# Patient Record
Sex: MALE | Race: WHITE | Employment: FULL TIME | URBAN - METROPOLITAN AREA
[De-identification: names, ages, dates, MRNs, and addresses within clinical notes are randomized per-mention and may not be internally consistent; named-entity substitution may affect disease eponyms.]

---

## 2019-01-18 ENCOUNTER — APPOINTMENT (OUTPATIENT)
Dept: GENERAL RADIOLOGY | Age: 29
End: 2019-01-18
Attending: EMERGENCY MEDICINE
Payer: COMMERCIAL

## 2019-01-18 ENCOUNTER — HOSPITAL ENCOUNTER (EMERGENCY)
Age: 29
Discharge: HOME | End: 2019-01-18
Attending: EMERGENCY MEDICINE
Payer: COMMERCIAL

## 2019-01-18 VITALS
HEART RATE: 78 BPM | TEMPERATURE: 98.2 F | WEIGHT: 210 LBS | RESPIRATION RATE: 16 BRPM | OXYGEN SATURATION: 97 % | DIASTOLIC BLOOD PRESSURE: 92 MMHG | BODY MASS INDEX: 29.4 KG/M2 | HEIGHT: 71 IN | SYSTOLIC BLOOD PRESSURE: 146 MMHG

## 2019-01-18 DIAGNOSIS — R06.02 SOB (SHORTNESS OF BREATH): Primary | ICD-10-CM

## 2019-01-18 LAB
ANION GAP SERPL CALC-SCNC: 10 MMOL/L (ref 5–15)
BUN SERPL-MCNC: 12 MG/DL (ref 6–20)
BUN/CREAT SERPL: 11 (ref 12–20)
CALCIUM SERPL-MCNC: 9 MG/DL (ref 8.5–10.1)
CHLORIDE SERPL-SCNC: 102 MMOL/L (ref 97–108)
CO2 SERPL-SCNC: 26 MMOL/L (ref 21–32)
COMMENT, HOLDF: NORMAL
CREAT SERPL-MCNC: 1.05 MG/DL (ref 0.7–1.3)
D DIMER PPP FEU-MCNC: 0.37 MG/L FEU (ref 0–0.65)
ERYTHROCYTE [DISTWIDTH] IN BLOOD BY AUTOMATED COUNT: 12.7 % (ref 11.5–14.5)
GLUCOSE SERPL-MCNC: 103 MG/DL (ref 65–100)
HCT VFR BLD AUTO: 44.4 % (ref 36.6–50.3)
HGB BLD-MCNC: 15.9 G/DL (ref 12.1–17)
MCH RBC QN AUTO: 31.4 PG (ref 26–34)
MCHC RBC AUTO-ENTMCNC: 35.8 G/DL (ref 30–36.5)
MCV RBC AUTO: 87.7 FL (ref 80–99)
NRBC # BLD: 0 K/UL (ref 0–0.01)
NRBC BLD-RTO: 0 PER 100 WBC
PLATELET # BLD AUTO: 302 K/UL (ref 150–400)
PMV BLD AUTO: 9.7 FL (ref 8.9–12.9)
POTASSIUM SERPL-SCNC: 3.8 MMOL/L (ref 3.5–5.1)
RBC # BLD AUTO: 5.06 M/UL (ref 4.1–5.7)
SAMPLES BEING HELD,HOLD: NORMAL
SODIUM SERPL-SCNC: 138 MMOL/L (ref 136–145)
WBC # BLD AUTO: 8.8 K/UL (ref 4.1–11.1)

## 2019-01-18 PROCEDURE — 85027 COMPLETE CBC AUTOMATED: CPT

## 2019-01-18 PROCEDURE — 85379 FIBRIN DEGRADATION QUANT: CPT

## 2019-01-18 PROCEDURE — 80048 BASIC METABOLIC PNL TOTAL CA: CPT

## 2019-01-18 PROCEDURE — 99282 EMERGENCY DEPT VISIT SF MDM: CPT

## 2019-01-18 PROCEDURE — 36415 COLL VENOUS BLD VENIPUNCTURE: CPT

## 2019-01-18 PROCEDURE — 71046 X-RAY EXAM CHEST 2 VIEWS: CPT

## 2019-01-18 RX ORDER — ALBUTEROL SULFATE 90 UG/1
2 AEROSOL, METERED RESPIRATORY (INHALATION)
Qty: 1 INHALER | Refills: 0 | Status: SHIPPED | OUTPATIENT
Start: 2019-01-18

## 2019-01-18 RX ORDER — FAMOTIDINE 20 MG/1
TABLET, FILM COATED ORAL
COMMUNITY
End: 2019-09-19 | Stop reason: ALTCHOICE

## 2019-01-18 RX ORDER — LEVOCETIRIZINE DIHYDROCHLORIDE 5 MG/1
5 TABLET, FILM COATED ORAL
COMMUNITY

## 2019-01-18 NOTE — ED TRIAGE NOTES
Pt has had SOB since Tuesday. Also had dizziness a couple nights ago. Pt feels like he cant take a deep breathe.

## 2019-01-18 NOTE — ED NOTES
The patient was discharged home by Dr Andie Vilchis in stable condition. The patient is alert and oriented, in no respiratory distress and discharge vital signs obtained. The patient's diagnosis, condition and treatment were explained. The patient expressed understanding. One prescription given. A discharge plan has been developed. Aftercare instructions were given. Pt ambulatory out of the ED.

## 2019-01-18 NOTE — ED PROVIDER NOTES
33yo M presents from home with cc of sob. PMH sig for HIV, gerd, liver disease. Pt feeling sob for 4 days. Started while on flight home from vacation in [place]. Denies cough, fever, n/v, chest pain. Since returning, pt has been able to go about normal activity but feels like he needs to take extra deep breaths. Never had symptoms like this before. No h/o DVT. + remote history of asthma. Shortness of Breath Pertinent negatives include no fever, no headaches, no cough, no chest pain, no abdominal pain and no rash. Past Medical History:  
Diagnosis Date  Acid reflux  HIV (human immunodeficiency virus infection) [facility] [address] [facility]  [facility] (human immunodeficiency virus infection) ([facility] 01/18/2019  Liver disease Fatty Liver Past Surgical History:  
Procedure Laterality Date  HX TYMPANOSTOMY History reviewed. No pertinent family history. Social History Socioeconomic History  Marital status: SINGLE Spouse name: Not on file  Number of children: Not on file  Years of education: Not on file  Highest education level: Not on file Social Needs  Financial resource strain: Not on file  Food insecurity - worry: Not on file  Food insecurity - inability: Not on file  Transportation needs - medical: Not on file  Transportation needs - non-medical: Not on file Occupational History  Not on file Tobacco Use  Smoking status: Current Every Day Smoker  Smokeless tobacco: Never Used Substance and Sexual Activity  Alcohol use: Yes Comment: rarely  Drug use: No  
 Sexual activity: Not on file Other Topics Concern  Not on file Social History Narrative  Not on file ALLERGIES: Amoxicillin and Augmentin [amoxicillin-pot clavulanate] Review of Systems Constitutional: Negative for diaphoresis and fever. HENT: Negative for facial swelling. Eyes: Negative for visual disturbance. Respiratory: Positive for shortness of breath. Negative for cough. Cardiovascular: Negative for chest pain. Gastrointestinal: Negative for abdominal pain. Genitourinary: Negative for dysuria. Musculoskeletal: Negative for joint swelling. Skin: Negative for rash. Neurological: Negative for headaches. Hematological: Negative for adenopathy. Psychiatric/Behavioral: Negative for suicidal ideas. Vitals:  
 01/18/19 1621 BP: (!) 153/100 Pulse: 78 Resp: 16 Temp: 98.2 °F (36.8 °C) SpO2: 98% Weight: 95.3 kg (210 lb) Height: 5' 11\" (1.803 m) Physical Exam  
Constitutional: He is oriented to person, place, and time. He appears well-developed and well-nourished. No distress. HENT:  
Head: Normocephalic and atraumatic. Mouth/Throat: Oropharynx is clear and moist.  
Eyes: Pupils are equal, round, and reactive to light. Neck: Normal range of motion. Neck supple. Cardiovascular: Normal rate, regular rhythm, normal heart sounds and intact distal pulses. Pulmonary/Chest: Effort normal and breath sounds normal. No respiratory distress. Abdominal: Soft. Bowel sounds are normal. He exhibits no distension. There is no tenderness. Musculoskeletal: Normal range of motion. He exhibits no edema. Neurological: He is alert and oriented to person, place, and time. Skin: Skin is warm and dry. Nursing note and vitals reviewed. MDM Number of Diagnoses or Management Options SOB (shortness of breath):  
Diagnosis management comments: A:  Sob with normal VS and exam.  Only risk factor for PE is recent travel. D-dimer normal.  Labs and CXR unremarkable. VS remain normal.  
 
P: 
Pt stable for discharge. Will give trial of alb MDI 
F/u with PCP as needed. Procedures

## 2019-01-18 NOTE — DISCHARGE INSTRUCTIONS

## 2019-06-26 ENCOUNTER — OFFICE VISIT (OUTPATIENT)
Dept: INTERNAL MEDICINE CLINIC | Age: 29
End: 2019-06-26

## 2019-06-26 VITALS
TEMPERATURE: 98.9 F | SYSTOLIC BLOOD PRESSURE: 130 MMHG | HEART RATE: 57 BPM | OXYGEN SATURATION: 98 % | DIASTOLIC BLOOD PRESSURE: 79 MMHG | HEIGHT: 72 IN | RESPIRATION RATE: 16 BRPM | BODY MASS INDEX: 29.26 KG/M2 | WEIGHT: 216 LBS

## 2019-06-26 DIAGNOSIS — S40.861A INSECT BITE OF RIGHT UPPER ARM, INITIAL ENCOUNTER: Primary | ICD-10-CM
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
## 2019-06-26 PROBLEM — B20 HIV (HUMAN IMMUNODEFICIENCY VIRUS INFECTION) (HCC): Status: ACTIVE | Noted: 2019-06-26

## 2019-06-26 RX ORDER — CEPHALEXIN 500 MG/1
500 CAPSULE ORAL 4 TIMES DAILY
Qty: 40 CAP | Refills: 0 | Status: SHIPPED | OUTPATIENT
Start: 2019-06-26 | End: 2019-09-19 | Stop reason: ALTCHOICE

## 2019-06-26 RX ORDER — ERGOCALCIFEROL 1.25 MG/1
50000 CAPSULE ORAL
COMMUNITY

## 2019-06-26 RX ORDER — OMEPRAZOLE 40 MG/1
40 CAPSULE, DELAYED RELEASE ORAL DAILY
COMMUNITY
End: 2019-09-19 | Stop reason: ALTCHOICE

## 2019-06-26 RX ORDER — ATORVASTATIN CALCIUM 20 MG/1
TABLET, FILM COATED ORAL DAILY
COMMUNITY

## 2019-06-26 NOTE — PROGRESS NOTES
Chaitanya Rodriguez  Identified pt with two pt identifiers(name and ). Chief Complaint   Patient presents with   [address]     right arm    Other     swollen right armpit   Patient of Dr Yonathan Naqvi who recently moved back to the area from [place]. Works for [employer] at [employer]. Noted a red area on upper arm yesterday which appeared to be an insect bite. This morning his armpit was also red & swollen. 1. Have you been to the ER, urgent care clinic since your last visit? Hospitalized since your last visit? no    2. Have you seen or consulted any other health care providers outside of the [address] [place] [address] since your last visit? Include any pap smears or colon screening. no      Medication reconciliation up to date and corrected with patient at this time. Today's provider has been notified of reason for visit, vitals and flowsheets obtained on patients. Reviewed record in preparation for visit, huddled with provider and have obtained necessary documentation. Depression Risk Factor Screening:     3 most recent PHQ Screens 2019   Little interest or pleasure in doing things Not at all   Feeling down, depressed, irritable, or hopeless Several days   Total Score PHQ 2 1       Functional Ability and Level of Safety:     Activities of Daily Living  No flowsheet data found. Fall Risk  No flowsheet data found. Abuse Screen  Abuse Screening Questionnaire 2019   Do you ever feel afraid of your partner? N   Are you in a relationship with someone who physically or mentally threatens you? N   Is it safe for you to go home?  Y           Health Maintenance Due   Topic    Pneumococcal 0-64 years (1 of 1 - PPSV23)    DTaP/Tdap/Td series (1 - Tdap)       Wt Readings from Last 3 Encounters:   19 216 lb (98 kg)   19 210 lb (95.3 kg)   16 170 lb (77.1 kg)     Temp Readings from Last 3 Encounters:   19 98.9 °F (37.2 °C) (Oral)   19 98.2 °F (36.8 °C) 03/14/16 98.5 °F (36.9 °C)     BP Readings from Last 3 Encounters:   06/26/19 130/79   01/18/19 (!) 146/92   03/14/16 120/74     Pulse Readings from Last 3 Encounters:   06/26/19 (!) 57   01/18/19 78   03/14/16 70     Vitals:    06/26/19 1134   BP: 130/79   Pulse: (!) 57   Resp: 16   Temp: 98.9 °F (37.2 °C)   TempSrc: Oral   SpO2: 98%   Weight: 216 lb (98 kg)   Height: 6' (1.829 m)   PainSc:   4   PainLoc: Arm         Patient Care Team   Patient Care Team:  Ivonne Donato NP as PCP - General (Nurse Practitioner)  Nawaf Be MD (Internal Medicine)

## 2019-06-26 NOTE — PATIENT INSTRUCTIONS

## 2019-06-26 NOTE — PROGRESS NOTES
Subjective:  Mr. Reggie York is a pleasant 34year old gentleman, patient of Dr. Jluis Gao, who comes in today for evaluation of a probable insect bite to his right upper arm. He tells me that he noted some soreness in his left upper arm, as well as some swollen glands in his right axilla yesterday. He really does not recall having been bitten by any insect, however he felt stinging on this right upper arm. Today he no longer feels a node in his right armpit, but remains slightly tender. He denies chills or fever. He denies nausea or vomiting. Further discussion with him revealed he is HIV positive. He is currently being treated at [facility]. He has been tolerating his medications well and his numbers are stable. Further discussion with him revealed that at the beginning of his diagnosis he also was diagnosed with Kaposi sarcoma. As his HIV was treated, this subsided. He tells me that [facility] are doing  CT scan every six months to look for lymphadenopathy. .  He recently had a CT scan that was normal.  He also showed me some lab studies he had done at [facility] in May, which revealed his WBC at 6.3. Past Medical History:   Diagnosis Date    Acid reflux     [facility] (human immunodeficiency virus infection) ([facility] [address] [facility]     HIV (human immunodeficiency virus infection) ([facility] [address] [facility] 01/18/2019    Liver disease     Fatty Liver     Past Surgical History:   Procedure Laterality Date    HX TYMPANOSTOMY         Current Outpatient Medications on File Prior to Visit   Medication Sig Dispense Refill    ergocalciferol (VITAMIN D2) 50,000 unit capsule Take 50,000 Units by mouth.  omeprazole (PRILOSEC) 40 mg capsule Take 40 mg by mouth daily.  atorvastatin (LIPITOR) 20 mg tablet Take  by mouth daily.  elvitegravir-cobicistat-emtricitabine-tenofovir alafenamide (GENVOYA) tab tablet Take 1 Tab by mouth daily (with breakfast).  levocetirizine (XYZAL) 5 mg tablet Take 5 mg by mouth.       famotidine (PEPCID) 20 mg tablet Take by mouth nightly.  albuterol (PROVENTIL HFA, VENTOLIN HFA, PROAIR HFA) 90 mcg/actuation inhaler Take 2 Puffs by inhalation every four (4) hours as needed for Wheezing. 1 Inhaler 0     No current facility-administered medications on file prior to visit. Allergies   Allergen Reactions    Amoxicillin Rash    Augmentin [Amoxicillin-Pot Clavulanate] Rash     Physical Examination:  GENERAL:  Pleasant young gentleman in no acute distress. He is alert and oriented. He answers my questions appropriately. He is non toxic. VITALS:  BP: 130/79. P: 57.  T: 98.9.  R: 16.  O2 sat: 98%. HEENT:  Normocephalic, atraumatic. NECK:  Supple without adenopathy. CHEST:  Lungs clear to auscultation. CV:  Heart regular rhythm without murmur. ABDOMEN:  Soft, NT, no OM or masses. EXTREMITIES:  No edema or calf tenderness. Distal pulses were present. Right arm - he has an approximately 25 cent reddened, slightly raised area on his mid upper arm. There is a little bit of redness that extends. I did not feel any axillary adenopathy on this right side. He is mildly tender. No extended cellulitis. Impression:  1. I suspect this is from an insect bite. Plan:  1. It was opted to start him on Keflex 500 mg four times a day for the next ten days. 2. He certainly will contact us should he have any concerns or if his condition worsens. 3. Should he develop chills, fever, nausea, vomiting, he should report to the ER.

## 2019-08-08 ENCOUNTER — APPOINTMENT (OUTPATIENT)
Dept: CT IMAGING | Age: 29
End: 2019-08-08
Attending: EMERGENCY MEDICINE
Payer: COMMERCIAL

## 2019-08-08 ENCOUNTER — HOSPITAL ENCOUNTER (EMERGENCY)
Age: 29
Discharge: HOME | End: 2019-08-08
Attending: EMERGENCY MEDICINE
Payer: COMMERCIAL

## 2019-08-08 VITALS
WEIGHT: 213.41 LBS | BODY MASS INDEX: 28.94 KG/M2 | RESPIRATION RATE: 18 BRPM | TEMPERATURE: 98.7 F | OXYGEN SATURATION: 97 % | DIASTOLIC BLOOD PRESSURE: 89 MMHG | SYSTOLIC BLOOD PRESSURE: 133 MMHG | HEART RATE: 76 BPM

## 2019-08-08 DIAGNOSIS — K04.7 TOOTH ABSCESS: Primary | ICD-10-CM

## 2019-08-08 LAB
ANION GAP SERPL CALC-SCNC: 12 MMOL/L (ref 5–15)
BASOPHILS # BLD: 0 K/UL (ref 0–0.1)
BASOPHILS NFR BLD: 0 % (ref 0–1)
BUN SERPL-MCNC: 12 MG/DL (ref 6–20)
BUN/CREAT SERPL: 11 (ref 12–20)
CALCIUM SERPL-MCNC: 9.6 MG/DL (ref 8.5–10.1)
CHLORIDE SERPL-SCNC: 100 MMOL/L (ref 97–108)
CO2 SERPL-SCNC: 25 MMOL/L (ref 21–32)
COMMENT, HOLDF: NORMAL
CREAT SERPL-MCNC: 1.08 MG/DL (ref 0.7–1.3)
DIFFERENTIAL METHOD BLD: NORMAL
EOSINOPHIL # BLD: 0.1 K/UL (ref 0–0.4)
EOSINOPHIL NFR BLD: 1 % (ref 0–7)
ERYTHROCYTE [DISTWIDTH] IN BLOOD BY AUTOMATED COUNT: 12.4 % (ref 11.5–14.5)
GLUCOSE SERPL-MCNC: 114 MG/DL (ref 65–100)
HCT VFR BLD AUTO: 45.1 % (ref 36.6–50.3)
HGB BLD-MCNC: 15.8 G/DL (ref 12.1–17)
IMM GRANULOCYTES # BLD AUTO: 0 K/UL (ref 0–0.04)
IMM GRANULOCYTES NFR BLD AUTO: 0 % (ref 0–0.5)
LYMPHOCYTES # BLD: 2 K/UL (ref 0.8–3.5)
LYMPHOCYTES NFR BLD: 24 % (ref 12–49)
MCH RBC QN AUTO: 30.9 PG (ref 26–34)
MCHC RBC AUTO-ENTMCNC: 35 G/DL (ref 30–36.5)
MCV RBC AUTO: 88.3 FL (ref 80–99)
MONOCYTES # BLD: 0.8 K/UL (ref 0–1)
MONOCYTES NFR BLD: 10 % (ref 5–13)
NEUTS SEG # BLD: 5.4 K/UL (ref 1.8–8)
NEUTS SEG NFR BLD: 65 % (ref 32–75)
NRBC # BLD: 0 K/UL (ref 0–0.01)
NRBC BLD-RTO: 0 PER 100 WBC
PLATELET # BLD AUTO: 295 K/UL (ref 150–400)
PMV BLD AUTO: 9.9 FL (ref 8.9–12.9)
POTASSIUM SERPL-SCNC: 4 MMOL/L (ref 3.5–5.1)
RBC # BLD AUTO: 5.11 M/UL (ref 4.1–5.7)
SAMPLES BEING HELD,HOLD: NORMAL
SODIUM SERPL-SCNC: 137 MMOL/L (ref 136–145)
WBC # BLD AUTO: 8.4 K/UL (ref 4.1–11.1)

## 2019-08-08 PROCEDURE — 96372 THER/PROPH/DIAG INJ SC/IM: CPT

## 2019-08-08 PROCEDURE — 99283 EMERGENCY DEPT VISIT LOW MDM: CPT

## 2019-08-08 PROCEDURE — 74011250636 HC RX REV CODE- 250/636: Performed by: EMERGENCY MEDICINE

## 2019-08-08 PROCEDURE — 36415 COLL VENOUS BLD VENIPUNCTURE: CPT

## 2019-08-08 PROCEDURE — 80048 BASIC METABOLIC PNL TOTAL CA: CPT

## 2019-08-08 PROCEDURE — 85025 COMPLETE CBC W/AUTO DIFF WBC: CPT

## 2019-08-08 RX ORDER — SODIUM CHLORIDE 9 MG/ML
50 INJECTION, SOLUTION INTRAVENOUS ONCE
Status: DISCONTINUED | OUTPATIENT
Start: 2019-08-08 | End: 2019-08-08

## 2019-08-08 RX ORDER — CLINDAMYCIN PHOSPHATE 900 MG/50ML
900 INJECTION INTRAVENOUS
Status: COMPLETED | OUTPATIENT
Start: 2019-08-08 | End: 2019-08-08

## 2019-08-08 RX ORDER — SODIUM CHLORIDE 0.9 % (FLUSH) 0.9 %
10 SYRINGE (ML) INJECTION ONCE
Status: DISCONTINUED | OUTPATIENT
Start: 2019-08-08 | End: 2019-08-08

## 2019-08-08 RX ADMIN — CLINDAMYCIN PHOSPHATE 900 MG: 900 INJECTION, SOLUTION INTRAVENOUS at 14:24

## 2019-08-08 NOTE — ED PROVIDER NOTES
HPI   The patient is a 79-year-old black male who presents to the emergency room with acute onset of having had third molars removed both upper and lower on the right side of his jaw 5 days prior to admission. Incidentally he has HIV with a CD4 count above 800 on Doloris Nigh therapy. Since then he was on clindamycin 300 mg 4 times a day but had increased swelling in the cheek and yesterday his dentist change his antibiotics to Keflex 500 twice 4 times a day. He has some mild swelling of the right cheek which is obscured the nasolabial fold but there is no redness or heat associated with it. He states he has been having fevers but he has none here. He came here for further evaluation and treatment. Past Medical History:   Diagnosis Date    Acid reflux     HIV (human immunodeficiency virus infection) ([facility]     HIV (human immunodeficiency virus infection) ([facility] 01/18/2019    Liver disease     Fatty Liver       Past Surgical History:   Procedure Laterality Date    HX TYMPANOSTOMY           History reviewed. No pertinent family history.     Social History     Socioeconomic History    Marital status: SINGLE     Spouse name: Not on file    Number of children: Not on file    Years of education: Not on file    Highest education level: Not on file   Occupational History    Not on file   Social Needs    Financial resource strain: Not on file    Food insecurity:     Worry: Not on file     Inability: Not on file    Transportation needs:     Medical: Not on file     Non-medical: Not on file   Tobacco Use    Smoking status: Current Every Day Smoker    Smokeless tobacco: Never Used   Substance and Sexual Activity    Alcohol use: Yes     Comment: rarely    Drug use: No    Sexual activity: Not on file   Lifestyle    Physical activity:     Days per week: Not on file     Minutes per session: Not on file    Stress: Not on file   Relationships    Social connections:     Talks on phone: Not on file     Gets together: Not on file     Attends Yarsanism service: Not on file     Active member of club or organization: Not on file     Attends meetings of clubs or organizations: Not on file     Relationship status: Not on file    Intimate partner violence:     Fear of current or ex partner: Not on file     Emotionally abused: Not on file     Physically abused: Not on file     Forced sexual activity: Not on file   Other Topics Concern    Not on file   Social History Narrative    Not on file         ALLERGIES: Amoxicillin and Augmentin [amoxicillin-pot clavulanate]    Review of Systems   All other systems reviewed and are negative. Vitals:    08/08/19 1350   BP: 133/89   Pulse: 76   Resp: 18   Temp: 98.7 °F (37.1 °C)   SpO2: 97%   Weight: 96.8 kg (213 lb 6.5 oz)            Physical Exam   Constitutional: He is oriented to person, place, and time. He appears well-developed. HENT:   Head: Normocephalic. There is some swelling of the right maxillary area with ill-defined nasolabial fold but no erythema or heat   Eyes: Pupils are equal, round, and reactive to light. Musculoskeletal: Normal range of motion. Neurological: He is alert and oriented to person, place, and time. Skin: Skin is dry. Psychiatric: He has a normal mood and affect. His behavior is normal.        MDM       Procedures           assessment and plan  I spoke with Dr. arvin dempsey from oral surgery and he would like to see the patient now before 4 PM and did not think a CT scan would be helpful.   Patient was discharged at 315 to go directly to see Dr. General Sherman

## 2019-08-08 NOTE — ED TRIAGE NOTES
TRIAGE NOTE:  Had wisdom teeth pulled Saturday, has been having fevers since about Tuesday.   Pt is concerned he has an infection, his dentist put him on antibiotics (keflex)  Pt concerned in part due to his HIV + status

## 2019-09-19 ENCOUNTER — OFFICE VISIT (OUTPATIENT)
Dept: INTERNAL MEDICINE CLINIC | Age: 29
End: 2019-09-19

## 2019-09-19 VITALS
TEMPERATURE: 98.2 F | HEIGHT: 72 IN | OXYGEN SATURATION: 98 % | RESPIRATION RATE: 16 BRPM | WEIGHT: 219.6 LBS | DIASTOLIC BLOOD PRESSURE: 81 MMHG | SYSTOLIC BLOOD PRESSURE: 127 MMHG | HEART RATE: 60 BPM | BODY MASS INDEX: 29.74 KG/M2

## 2019-09-19 DIAGNOSIS — J01.90 ACUTE SINUSITIS, RECURRENCE NOT SPECIFIED, UNSPECIFIED LOCATION: ICD-10-CM

## 2019-09-19 DIAGNOSIS — J02.9 ACUTE PHARYNGITIS, UNSPECIFIED ETIOLOGY: Primary | ICD-10-CM

## 2019-09-19 RX ORDER — PREDNISONE 10 MG/1
TABLET ORAL
Qty: 21 TAB | Refills: 0 | Status: SHIPPED | OUTPATIENT
Start: 2019-09-19 | End: 2019-12-20 | Stop reason: ALTCHOICE

## 2019-09-19 RX ORDER — HYDROGEN PEROXIDE 3 %
SOLUTION, NON-ORAL MISCELLANEOUS DAILY
COMMUNITY
End: 2019-09-19 | Stop reason: SDUPTHER

## 2019-09-19 RX ORDER — AZITHROMYCIN 250 MG/1
TABLET, FILM COATED ORAL
Qty: 6 TAB | Refills: 0 | Status: SHIPPED | OUTPATIENT
Start: 2019-09-19 | End: 2019-12-20 | Stop reason: ALTCHOICE

## 2019-09-19 RX ORDER — HYDROGEN PEROXIDE 3 %
20 SOLUTION, NON-ORAL MISCELLANEOUS DAILY
Qty: 90 CAP | Refills: 3 | Status: SHIPPED | OUTPATIENT
Start: 2019-09-19 | End: 2020-06-19 | Stop reason: SDUPTHER

## 2019-09-19 NOTE — PROGRESS NOTES
Subjective:  Mr. Pino Anne is a pleasant 34year old gentleman who comes in today for evaluation of a one week history of what started out as sore throat and now he has developed some nasal congestion, postnasal drainage, pressure over his sinuses and a cough he attributes to the drainage. He denies SOB, wheezing or hemoptysis. Denies nausea or vomiting. Denies chills or fever. He has been using some Mucinex from OTC without any improvement. Past Medical History:   Diagnosis Date    Acid reflux     [facility]) [facility] [facility]     [facility] 01/18/2019    Liver disease     Fatty Liver     Past Surgical History:   Procedure Laterality Date    HX TYMPANOSTOMY         Current Outpatient Medications on File Prior to Visit   Medication Sig Dispense Refill    ergocalciferol (VITAMIN D2) 50,000 unit capsule Take 50,000 Units by mouth.  atorvastatin (LIPITOR) 20 mg tablet Take  by mouth daily.  elvitegravir-cobicistat-emtricitabine-tenofovir alafenamide (GENVOYA) tab tablet Take 1 Tab by mouth daily (with breakfast).  levocetirizine (XYZAL) 5 mg tablet Take 5 mg by mouth.  albuterol (PROVENTIL HFA, VENTOLIN HFA, PROAIR HFA) 90 mcg/actuation inhaler Take 2 Puffs by inhalation every four (4) hours as needed for Wheezing. 1 Inhaler 0     No current facility-administered medications on file prior to visit. Allergies   Allergen Reactions    Amoxicillin Rash    Augmentin [Amoxicillin-Pot Clavulanate] Rash   Physical Examination:  GENERAL:  Pleasant gentleman in no acute distress. He is alert and oriented. Answers my questions appropriately. VITALS:  BP: 127/81. P: 60.  R: 16.  T: 98.2. O2 sat: 98. HEENT:  Normocephalic, atraumatic. TMs normal.  Mouth mucosa pink. Tongue midline. Pharynx erythematous without presence of exudate. He is mildly tender over maxillary sinuses.   NECK:  Supple with non tender, posterior cervical adenopathy, left worse than right. CHEST:  Lungs clear to auscultation, no rales or wheezes. CV:  Heart regular rhythm without murmur. EXTREMITIES:  No edema or calf tenderness. Distal pulses were present. Impression:  1. Acute sinusitis. 2. Acute pharyngitis. Plan:  1. It was opted to start him on a Z-Parminder along with a prednisone pack. 2. He may gargle with salty water. 3. He is to increase fluid and rest.  4. He may use Tylenol alternating with ibuprofen for fever or aches.   He certainly will contact us should he fail to improve or if his conditio

## 2019-09-19 NOTE — PROGRESS NOTES
Chief Complaint   Patient presents with    Sore Throat     clear drainage down throat, taking ipratropium no improvement         1. Have you been to the ER, urgent care clinic since your last visit? Hospitalized since your last visit? no    2. Have you seen or consulted any other health care providers outside of the [address] [place] [address] since your last visit? Include any pap smears or colon screening.   no

## 2019-09-19 NOTE — PATIENT INSTRUCTIONS
Sore Throat: Care Instructions  Your Care Instructions    Infection by bacteria or a virus causes most sore throats. Cigarette smoke, dry air, air pollution, allergies, and yelling can also cause a sore throat. Sore throats can be painful and annoying. Fortunately, most sore throats go away on their own. If you have a bacterial infection, your doctor may prescribe antibiotics. Follow-up care is a key part of your treatment and safety. Be sure to make and go to all appointments, and call your doctor if you are having problems. It's also a good idea to know your test results and keep a list of the medicines you take. How can you care for yourself at home? · If your doctor prescribed antibiotics, take them as directed. Do not stop taking them just because you feel better. You need to take the full course of antibiotics. · Gargle with warm salt water once an hour to help reduce swelling and relieve discomfort. Use 1 teaspoon of salt mixed in 1 cup of warm water. · Take an over-the-counter pain medicine, such as acetaminophen (Tylenol), ibuprofen (Advil, Motrin), or naproxen (Aleve). Read and follow all instructions on the label. · Be careful when taking over-the-counter cold or flu medicines and Tylenol at the same time. Many of these medicines have acetaminophen, which is Tylenol. Read the labels to make sure that you are not taking more than the recommended dose. Too much acetaminophen (Tylenol) can be harmful. · Drink plenty of fluids. Fluids may help soothe an irritated throat. Hot fluids, such as tea or soup, may help decrease throat pain. · Use over-the-counter throat lozenges to soothe pain. Regular cough drops or hard candy may also help. These should not be given to young children because of the risk of choking. · Do not smoke or allow others to smoke around you. If you need help quitting, talk to your doctor about stop-smoking programs and medicines.  These can increase your chances of quitting for good. · Use a vaporizer or humidifier to add moisture to your bedroom. Follow the directions for cleaning the machine. When should you call for help? Call your doctor now or seek immediate medical care if:    · You have new or worse trouble swallowing.     · Your sore throat gets much worse on one side.    Watch closely for changes in your health, and be sure to contact your doctor if you do not get better as expected. Where can you learn more? Go to http://maki-delvis.info/. Enter [phone] [id number] 19 in the search box to learn more about \"Sore Throat: Care Instructions. \"  Current as of: October 21, 2018  Content Version: 12.1  © 2421-8790 [employer]. Care instructions adapted under license by [employer] (which disclaims liability or warranty for this information). If you have questions about a medical condition or this instruction, always ask your healthcare professional. [address] any warranty or liability for your use of this information.

## 2019-12-20 ENCOUNTER — OFFICE VISIT (OUTPATIENT)
Dept: INTERNAL MEDICINE CLINIC | Age: 29
End: 2019-12-20

## 2019-12-20 VITALS
WEIGHT: 230 LBS | DIASTOLIC BLOOD PRESSURE: 72 MMHG | TEMPERATURE: 97.9 F | BODY MASS INDEX: 31.15 KG/M2 | HEART RATE: 68 BPM | SYSTOLIC BLOOD PRESSURE: 116 MMHG | OXYGEN SATURATION: 98 % | RESPIRATION RATE: 16 BRPM | HEIGHT: 72 IN

## 2019-12-20 DIAGNOSIS — J01.00 ACUTE NON-RECURRENT MAXILLARY SINUSITIS: Primary | ICD-10-CM

## 2019-12-20 DIAGNOSIS — B20 HIV INFECTION, UNSPECIFIED SYMPTOM STATUS (HCC): ICD-10-CM

## 2019-12-20 RX ORDER — CEFUROXIME AXETIL 500 MG/1
500 TABLET ORAL 2 TIMES DAILY
Qty: 20 TAB | Refills: 0 | Status: SHIPPED | OUTPATIENT
Start: 2019-12-20 | End: 2020-04-01 | Stop reason: ALTCHOICE

## 2019-12-20 NOTE — PATIENT INSTRUCTIONS
Acquired Immunodeficiency Syndrome (AIDS): Care Instructions Your Care Instructions Human immunodeficiency virus (HIV) is a virus that attacks the body's immune system. This makes it hard for the body to fight infection and disease. HIV causes acquired immunodeficiency syndrome (AIDS). AIDS is the last and most severe stage of the HIV infection. HIV attacks and destroys a type of white blood cell called CD4+ cells, or helper cells. These cells are an important part of the immune system. You have AIDS when one or both of the following are true: 
· Your CD4+ cell count is below 200 cells per microliter (µL) of blood. · You get certain infections or cancers that are usually only seen in people who have problems with their immune system. Follow-up care is a key part of your treatment and safety. Be sure to make and go to all appointments, and call your doctor if you are having problems. It's also a good idea to know your test results and keep a list of the medicines you take. How can you care for yourself at home? · Take your medicines exactly as prescribed. Call your doctor if you think you are having a problem with your medicine. · Get the vaccines and medicine you need to prevent infections such as pneumonia or tuberculosis. · Learn more about HIV and AIDS so you can be active in your health care decisions. · Join a support group. These let you share experiences and seek support from others in the same situation. · Do not smoke. People with HIV have an increased risk of heart attacks and lung cancer. Smoking increases these risks even more. If you need help quitting, talk to your doctor about stop-smoking programs and medicines. These can increase your chances of quitting for good. · Do not use illegal drugs. And limit your use of alcohol. · Eat a healthy, balanced diet. This keeps your immune system as strong as possible. · Exercise regularly. It can reduce your stress, increase your energy, and lift your mood. · If you have not already done so, prepare a list of advance directives. These tell your doctor and family members what kind of care you want if you become unable to speak for yourself. Helping a partner with AIDS If your partner has AIDS, you can help provide emotional, physical, and medical care that will improve his or her quality of life. · Give emotional support. Listen to and encourage your partner. · Protect yourself and others against HIV infection and other infections by: 
? Not sharing needles. ? Always using condoms during sex. · Protect your partner by staying away from him or her when you are sick. · Take care of yourself. Share your experiences with others and get help when you need it. · Learn how to give medicines, and know where to get help in an emergency. When should you call for help? Call 911 anytime you think you may need emergency care. For example, call if: 
  · You passed out (lost consciousness).  
  · You have severe shortness of breath.  
  · You have symptoms of a stroke. These may include: 
? Sudden numbness, tingling, weakness, or loss of movement in your face, arm, or leg, especially on only one side of your body. ? Sudden vision changes. ? Sudden trouble speaking. ? Sudden confusion or trouble understanding simple statements. ? Sudden problems with walking or balance. ? A sudden, severe headache that is different from past headaches.  
 Call your doctor now or seek immediate medical care if: 
  · You have signs of a new or worse problem from HIV, such as: ? A fever. ? Coughing. ? Diarrhea. ? Skin changes. ? Bleeding. ? Confusion or not thinking clearly.  
 Watch closely for changes in your health, and be sure to contact your doctor if you have any problems. Where can you learn more? Go to http://maki-delvis.info/. Enter L075 in the search box to learn more about \"Acquired Immunodeficiency Syndrome (AIDS): Care Instructions. \" Current as of: June 9, 2019 Content Version: 12.2 © 1677-2316 [employer]. Care instructions adapted under license by [employer] (which disclaims liability or warranty for this information). If you have questions about a medical condition or this instruction, always ask your healthcare professional. [place] [address] any warranty or liability for your use of this information.

## 2019-12-20 NOTE — PROGRESS NOTES
Sherie Eldridge is a 34 y.o. male     Chief Complaint   Patient presents with    Sinus Infection     scratchy throat sinus pressure and drainage for the past few days. Visit Vitals  /72 (BP 1 Location: Left arm, BP Patient Position: Sitting)   Pulse 68   Temp 97.9 °F (36.6 °C) (Oral)   Resp 16   Ht 6' (1.829 m)   Wt 230 lb (104.3 kg)   SpO2 98%   BMI 31.19 kg/m²       Health Maintenance Due   Topic Date Due    Pneumococcal 0-64 years (1 of 3 - PCV13) 05/30/1996    DTaP/Tdap/Td series (1 - Tdap) 05/30/2001    Influenza Age 5 to Adult  08/01/2019       1. Have you been to the ER, urgent care clinic since your last visit? Hospitalized since your last visit? No    2. Have you seen or consulted any other health care providers outside of the [facility] since your last visit? Include any pap smears or colon screening.  No

## 2019-12-20 NOTE — PROGRESS NOTES
Subjective:   Peyman Mari is a 34 y.o. male      Chief Complaint   Patient presents with    Sinus Infection     scratchy throat sinus pressure and drainage for the past few days. History of present illness: He presents complaint a lot of sinus and nasal congestion with some purulent nasal drainage. This been on for several days and not clearing up. He does have a little cough but no sore throat. He has noted no fevers or chills.     Patient Active Problem List   Diagnosis Code    HIV (human immunodeficiency virus infection) ([facility] B18      Past Medical History:   Diagnosis Date    Acid reflux     HIV (human immunodeficiency virus infection) ([facility]     HIV (human immunodeficiency virus infection) ([facility] 2019    Liver disease     Fatty Liver      Allergies   Allergen Reactions    Amoxicillin Rash    Augmentin [Amoxicillin-Pot Clavulanate] Rash      Family History   Problem Relation Age of Onset    Heart Attack Father     No Known Problems Sister       Social History     Socioeconomic History    Marital status: SINGLE     Spouse name: Not on file    Number of children: Not on file    Years of education: Not on file    Highest education level: Not on file   Occupational History    Not on file   Social Needs    Financial resource strain: Not on file    Food insecurity:     Worry: Not on file     Inability: Not on file    Transportation needs:     Medical: Not on file     Non-medical: Not on file   Tobacco Use    Smoking status: Former Smoker     Last attempt to quit: 2019     Years since quittin.4    Smokeless tobacco: Never Used    Tobacco comment: quit   Substance and Sexual Activity    Alcohol use: Yes     Comment: rarely    Drug use: No    Sexual activity: Not on file   Lifestyle    Physical activity:     Days per week: Not on file     Minutes per session: Not on file    Stress: Not on file   Relationships    Social connections:     Talks on phone: Not on file Gets together: Not on file     Attends Taoism service: Not on file     Active member of club or organization: Not on file     Attends meetings of clubs or organizations: Not on file     Relationship status: Not on file    Intimate partner violence:     Fear of current or ex partner: Not on file     Emotionally abused: Not on file     Physically abused: Not on file     Forced sexual activity: Not on file   Other Topics Concern    Not on file   Social History Narrative    Not on file     Prior to Admission medications    Medication Sig Start Date End Date Taking? Authorizing Provider   cefUROXime (CEFTIN) 500 mg tablet Take 1 Tab by mouth two (2) times a day. 12/20/19  Yes Concha Corbett MD   esomeprazole (NEXIUM) 20 mg capsule Take 1 Cap by mouth daily. 9/19/19  Yes Fer Haque NP   ergocalciferol (VITAMIN D2) 50,000 unit capsule Take 50,000 Units by mouth. Yes Provider, Historical   atorvastatin (LIPITOR) 20 mg tablet Take  by mouth daily. Yes Provider, Historical   elvitegravir-cobicistat-emtricitabine-tenofovir alafenamide (GENVOYA) tab tablet Take 1 Tab by mouth daily (with breakfast). Yes Other, MD Skyler   levocetirizine (XYZAL) 5 mg tablet Take 5 mg by mouth. Yes Other, MD Skyler   albuterol (PROVENTIL HFA, VENTOLIN HFA, PROAIR HFA) 90 mcg/actuation inhaler Take 2 Puffs by inhalation every four (4) hours as needed for Wheezing. 1/18/19  Yes Linnea Mcnamara MD        Review of Systems              Constitutional:  He denies fever, weight loss, sweats or fatigue. EYES: No blurred or double vision,               ENT: Positive for head and nasal congestion, no headache or dizziness. No difficulty with               swallowing, taste, speech or smell. Respiratory: Some cough from drainage without wheezing or shortness of breath. No sputum production. Cardiac:  Denies chest pain, palpitations, unexplained indigestion, syncope, edema, PND or orthopnea.     GI:  No changes in bowel movements, no abdominal pain, no bloating, anorexia, nausea, vomiting or heartburn. :  No frequency or dysuria. Denies incontinence or sexual dysfunction. Extremities:  No joint pain, stiffness or swelling  Back:.no pain or soreness  Skin:  No recent rashes or mole changes. Neurological:  No numbness, tingling, burning paresthesias or loss of motor strength. No syncope, dizziness, frequent headaches or memory loss. Hematologic:  No easy bruising  Lymphatic: No lymph node enlargement    Objective:     Vitals:    12/20/19 1514   BP: 116/72   Pulse: 68   Resp: 16   Temp: 97.9 °F (36.6 °C)   TempSrc: Oral   SpO2: 98%   Weight: 230 lb (104.3 kg)   Height: 6' (1.829 m)   PainSc:   0 - No pain       Body mass index is 31.19 kg/m². Physical Examination:              General Appearance:  Well-developed, well-nourished, no acute distress. HEENT:      Ears:  The TMs and ear canals were clear. Eyes:  The pupillary responses were normal.  Extraocular muscle function intact. Lids and conjunctiva not injected. Funduscopic exam revealed sharp disc margins. Nares: Inflamed and markedly edematous  Pharynx:  Clear with teeth in good repair. No masses were noted. Neck:  Supple without thyromegaly or adenopathy. No JVD noted. No carotid                bruits. Lungs:  Clear to auscultation and percussion. Cardiac:  Regular rate and rhythm without murmur. PMI not displaced. No gallop, rub or click. Abdominal: Soft, non-tender, no hepata-spleenomegally or masses  Extremities:  No clubbing, cyanosis or edema. Skin:  No rash or unusual mole changes noted. Lymph Nodes:  None felt in the cervical, supraclavicular, axillary or inguinal region. Neurological: . DTRs 2+ and symmetric. Station and gait normal.   Hematologic:   No purpura or petechiae        Assessment/Plan:         1. Acute non-recurrent maxillary sinusitis    2.  HIV infection, unspecified symptom status ([facility] Impressions/Plan:  Impression  1. Acute sinusitis we will treat this with Ceftin 5 mg twice daily for 10 days. He does note his penicillin rash was when he was a kid and he has had no penicillin and cannot remember what is ever had cephalosporins. 2.  HIV infection I do not think is playing a role currently  Recheck if not resolved. Orders Placed This Encounter    cefUROXime (CEFTIN) 500 mg tablet       Follow-up and Dispositions    · Return if symptoms worsen or fail to improve. No results found for any visits on 12/20/19. Flavio Anna MD    The patient was given after the visit summary the patient verbalized an understanding of the plans and problems as explained.

## 2020-04-01 ENCOUNTER — VIRTUAL VISIT (OUTPATIENT)
Dept: INTERNAL MEDICINE CLINIC | Age: 30
End: 2020-04-01

## 2020-04-01 DIAGNOSIS — F41.9 ANXIETY: Primary | ICD-10-CM

## 2020-04-01 RX ORDER — LORAZEPAM 1 MG/1
1 TABLET ORAL
Qty: 30 TAB | Refills: 1 | Status: SHIPPED | OUTPATIENT
Start: 2020-04-01

## 2020-04-01 RX ORDER — BICTEGRAVIR SODIUM, EMTRICITABINE, AND TENOFOVIR ALAFENAMIDE FUMARATE 50; 200; 25 MG/1; MG/1; MG/1
1 TABLET ORAL DAILY
COMMUNITY

## 2020-04-01 NOTE — PATIENT INSTRUCTIONS

## 2020-04-01 NOTE — PROGRESS NOTES
Renetta Mcfarland is a 34 y.o. male who was seen by synchronous (real-time) audio-video technology on 2020. Consent:  He and/or his healthcare decision maker is aware that this patient-initiated Telehealth encounter is a billable service, with coverage as determined by his insurance carrier. He is aware that he may receive a bill and has provided verbal consent to proceed: Yes    I was at the office while conducting this encounter. Constitutional: Subjective:  Mr. Holley Sandhoff is a pleasant 34year old gentleman, who called in earlier today to request a refill on Lorazepam 1 mg that was given to him 2-3 years ago by a primary care physician for some anxiety. He has not used the medication for over a year, it has now . He currently works at [employer] and he tells me that in the last week he has had two episodes of fairly severe anxiety. He is working long hours and needless to say his job has been quite stressful. He denies any suicidal thoughts. I spent at least 5 minutes with this established patient, and >50% of the time was spent counseling and/or coordinating care regarding his anxiety. 712  Subjective:   Renetta Mcfarland was seen for Medication Refill      Prior to Admission medications    Medication Sig Start Date End Date Taking? Authorizing Provider   ninacyqhc-upnaoljl-gxjvhys ala (Biktarvy) tab tablet Take 1 Tab by mouth daily. Yes Provider, Historical   LORazepam (ATIVAN) 1 mg tablet Take 1 Tab by mouth every four (4) hours as needed for Anxiety. Max Daily Amount: 6 mg. 20  Yes Dougie Haque Smoker, NP   esomeprazole (NEXIUM) 20 mg capsule Take 1 Cap by mouth daily. 19  Yes Dougie Haque Smoker, NP   ergocalciferol (VITAMIN D2) 50,000 unit capsule Take 50,000 Units by mouth. Yes Provider, Historical   atorvastatin (LIPITOR) 20 mg tablet Take  by mouth daily. Yes Provider, Historical   levocetirizine (XYZAL) 5 mg tablet Take 5 mg by mouth. Yes Other, MD Skyler   albuterol (PROVENTIL HFA, VENTOLIN HFA, PROAIR HFA) 90 mcg/actuation inhaler Take 2 Puffs by inhalation every four (4) hours as needed for Wheezing. 1/18/19  Yes Chelsey Wise MD   cefUROXime (CEFTIN) 500 mg tablet Take 1 Tab by mouth two (2) times a day. 12/20/19 4/1/20  Dipti Narvaez MD   elvitegravir-cobicistat-emtricitabine-tenofovir alafenamide (GENVOYA) tab tablet Take 1 Tab by mouth daily (with breakfast). Other, MD Skyler     Allergies   Allergen Reactions    Amoxicillin Rash    Augmentin [Amoxicillin-Pot Clavulanate] Rash       Patient Active Problem List   Diagnosis Code    HIV (human immunodeficiency virus infection) ([facility] B20       ROS    Review of Systems:  Otherwise he denies any headaches, dizziness or blurred vision. He denies chest pain or palpitations. He notes shortness of breath only when he feels anxious. He denies any wheezing, cough or hemoptysis. Denies any PND or orthopnea. His appetite has remained good, weight stable. Stools are normal.  He denies any urinary symptoms. Physical Examination:  GENERAL:  Well developed, well nourished, young man in no acute distress. He is alert and oriented. He answers my questions appropriately. He had a normal gait as he traveled in his house to get his medication. Impression:  1. Anxiety, exacerbated by current situation with the coronavirus. Plan:  1. It was opted to restart him on his Lorazepam 1 mg, to take one tablet daily as needed. 2. He certainly will call us if he has any concerns or if he fails to improve. Pursuant to the emergency declaration under the [facility] waiver authority and the [employer] Act, this Virtual  Visit was conducted, with patient's consent, to reduce the patient's risk of exposure to COVID-19 and provide continuity of care for an established patient.      Services were provided through a video synchronous discussion virtually to substitute for in-person clinic visit. Natalee Hill NP            Past Medical History:   Diagnosis Date    Acid reflux     HIV (human immunodeficiency virus infection) ([facility]     HIV (human immunodeficiency virus infection) ([facility] 01/18/2019    Liver disease     Fatty Liver     Past Surgical History:   Procedure Laterality Date    HX TYMPANOSTOMY         Current Outpatient Medications on File Prior to Visit   Medication Sig Dispense Refill    yyzhkknov-rimbyhhv-kijexsu ala (Biktarvy) tab tablet Take 1 Tab by mouth daily.  esomeprazole (NEXIUM) 20 mg capsule Take 1 Cap by mouth daily. 90 Cap 3    ergocalciferol (VITAMIN D2) 50,000 unit capsule Take 50,000 Units by mouth.  atorvastatin (LIPITOR) 20 mg tablet Take  by mouth daily.  levocetirizine (XYZAL) 5 mg tablet Take 5 mg by mouth.  albuterol (PROVENTIL HFA, VENTOLIN HFA, PROAIR HFA) 90 mcg/actuation inhaler Take 2 Puffs by inhalation every four (4) hours as needed for Wheezing. 1 Inhaler 0    [DISCONTINUED] cefUROXime (CEFTIN) 500 mg tablet Take 1 Tab by mouth two (2) times a day. 20 Tab 0    elvitegravir-cobicistat-emtricitabine-tenofovir alafenamide (GENVOYA) tab tablet Take 1 Tab by mouth daily (with breakfast). No current facility-administered medications on file prior to visit.       Allergies   Allergen Reactions    Amoxicillin Rash    Augmentin [Amoxicillin-Pot Clavulanate] Rash

## 2020-04-01 NOTE — PROGRESS NOTES
Shantell Acuña is a 34 y.o. male     Chief Complaint   Patient presents with    Medication Refill       There were no vitals taken for this visit. Health Maintenance Due   Topic Date Due    Pneumococcal 0-64 years (1 of 3 - PCV13) 05/30/1996    Lipid Screen  05/30/2000    DTaP/Tdap/Td series (1 - Tdap) 05/30/2011       1. Have you been to the ER, urgent care clinic since your last visit? Hospitalized since your last visit? No    2. Have you seen or consulted any other health care providers outside of the [address] [place] [address] since your last visit? Include any pap smears or colon screening.  No

## 2020-06-01 PROBLEM — C46.9 KAPOSI SARCOMA (HCC): Status: ACTIVE | Noted: 2020-05-26

## 2020-06-01 PROBLEM — K21.9 GASTROESOPHAGEAL REFLUX DISEASE: Status: ACTIVE | Noted: 2020-06-01

## 2020-06-01 PROBLEM — K76.0 STEATOSIS OF LIVER: Status: ACTIVE | Noted: 2020-06-01

## 2020-06-01 PROBLEM — R59.1 LYMPHADENOPATHY: Status: ACTIVE | Noted: 2020-06-01

## 2020-06-01 PROBLEM — E66.9 OBESITY: Status: ACTIVE | Noted: 2020-06-01

## 2020-06-01 PROBLEM — B20 HIV INFECTION (HCC): Status: ACTIVE | Noted: 2020-06-01

## 2020-06-19 NOTE — TELEPHONE ENCOUNTER
Requested Prescriptions     Pending Prescriptions Disp Refills    esomeprazole (NEXIUM) 20 mg capsule 90 Cap 3     Sig: Take 1 Cap by mouth daily.        Last Refill: 9/19/19  Next Appointment:stephy ernst last seen 4/1/20 virtual visit

## 2020-06-22 RX ORDER — HYDROGEN PEROXIDE 3 %
20 SOLUTION, NON-ORAL MISCELLANEOUS DAILY
Qty: 90 CAP | Refills: 3 | Status: SHIPPED | OUTPATIENT
Start: 2020-06-22

## 2020-09-21 ENCOUNTER — HOSPITAL ENCOUNTER (EMERGENCY)
Age: 30
Discharge: HOME | End: 2020-09-21
Attending: EMERGENCY MEDICINE
Payer: COMMERCIAL

## 2020-09-21 VITALS
WEIGHT: 232.14 LBS | HEART RATE: 88 BPM | DIASTOLIC BLOOD PRESSURE: 74 MMHG | SYSTOLIC BLOOD PRESSURE: 134 MMHG | OXYGEN SATURATION: 100 % | HEIGHT: 72 IN | TEMPERATURE: 98.9 F | BODY MASS INDEX: 31.44 KG/M2 | RESPIRATION RATE: 16 BRPM

## 2020-09-21 DIAGNOSIS — J02.0 ACUTE STREPTOCOCCAL PHARYNGITIS: Primary | ICD-10-CM

## 2020-09-21 LAB — DEPRECATED S PYO AG THROAT QL EIA: POSITIVE

## 2020-09-21 PROCEDURE — 74011250637 HC RX REV CODE- 250/637: Performed by: EMERGENCY MEDICINE

## 2020-09-21 PROCEDURE — 87880 STREP A ASSAY W/OPTIC: CPT

## 2020-09-21 PROCEDURE — 74011250636 HC RX REV CODE- 250/636: Performed by: EMERGENCY MEDICINE

## 2020-09-21 PROCEDURE — 99283 EMERGENCY DEPT VISIT LOW MDM: CPT

## 2020-09-21 RX ORDER — DEXAMETHASONE 4 MG/1
10 TABLET ORAL
Status: COMPLETED | OUTPATIENT
Start: 2020-09-21 | End: 2020-09-21

## 2020-09-21 RX ORDER — CEPHALEXIN 250 MG/1
500 CAPSULE ORAL
Status: COMPLETED | OUTPATIENT
Start: 2020-09-21 | End: 2020-09-21

## 2020-09-21 RX ORDER — DEXAMETHASONE 4 MG/1
TABLET ORAL
Status: DISCONTINUED
Start: 2020-09-21 | End: 2020-09-21 | Stop reason: HOSPADM

## 2020-09-21 RX ORDER — CEPHALEXIN 500 MG/1
500 CAPSULE ORAL 2 TIMES DAILY
Qty: 20 CAP | Refills: 0 | Status: SHIPPED | OUTPATIENT
Start: 2020-09-21 | End: 2020-10-01

## 2020-09-21 RX ADMIN — CEPHALEXIN 500 MG: 250 CAPSULE ORAL at 01:56

## 2020-09-21 RX ADMIN — DEXAMETHASONE 10 MG: 4 TABLET ORAL at 01:19

## 2020-09-21 NOTE — ED PROVIDER NOTES
She Jarrell is a 28 yo M with sore throat and left ear pain. He states that he has had pain for the past week. He has been taking a z-pack but it has not helped. He denies fever or chills or cough. Pain is increased with swallowing.   He states that he has a mild allergy to amoxicillin but tolerates cephalosporins           Past Medical History:   Diagnosis Date    Acid reflux     [facility]     HIV (human immunodeficiency virus infection) ([facility] 2019    Liver disease     Fatty Liver       Past Surgical History:   Procedure Laterality Date    HX TYMPANOSTOMY           Family History:   Problem Relation Age of Onset    Heart Attack Father     No Known Problems Sister        Social History     Socioeconomic History    Marital status: SINGLE     Spouse name: Not on file    Number of children: Not on file    Years of education: Not on file    Highest education level: Not on file   Occupational History    Not on file   Social Needs    Financial resource strain: Not on file    Food insecurity     Worry: Not on file     Inability: Not on file    Transportation needs     Medical: Not on file     Non-medical: Not on file   Tobacco Use    Smoking status: Former Smoker     Packs/day: 1.00     Years: 6.00     Pack years: 6.00     Last attempt to quit: 2019     Years since quittin.2    Smokeless tobacco: Never Used    Tobacco comment: quit   Substance and Sexual Activity    Alcohol use: Yes     Comment: rarely    Drug use: No    Sexual activity: Not on file   Lifestyle    Physical activity     Days per week: Not on file     Minutes per session: Not on file    Stress: Not on file   Relationships    Social connections     Talks on phone: Not on file     Gets together: Not on file     Attends Anglican service: Not on file     Active member of club or organization: Not on file     Attends meetings of clubs or organizations: Not on file Relationship status: Not on file    Intimate partner violence     Fear of current or ex partner: Not on file     Emotionally abused: Not on file     Physically abused: Not on file     Forced sexual activity: Not on file   Other Topics Concern    Not on file   Social History Narrative    Not on file         ALLERGIES: Amoxicillin and Augmentin [amoxicillin-pot clavulanate]    Review of Systems   Constitutional: Negative for fever. HENT: Positive for ear pain and sore throat. Eyes: Negative for visual disturbance. Respiratory: Negative for cough. Cardiovascular: Negative for chest pain. Gastrointestinal: Negative for abdominal pain. Genitourinary: Negative for dysuria. Musculoskeletal: Negative for back pain. Skin: Negative for rash. Neurological: Negative for headaches. Vitals:    09/21/20 0013   BP: (!) 145/96   Pulse: 94   Resp: 16   Temp: 98.5 °F (36.9 °C)   SpO2: 99%   Weight: 105.3 kg (232 lb 2.3 oz)   Height: 6' (1.829 m)            Physical Exam  Vitals signs and nursing note reviewed. Constitutional:       General: He is not in acute distress. Appearance: He is well-developed. HENT:      Head: Normocephalic and atraumatic. Right Ear: Tympanic membrane normal. There is no impacted cerumen. Left Ear: Tympanic membrane normal. There is no impacted cerumen. Ears:      Comments: Moderate cerumen bilaterally     Mouth/Throat:      Pharynx: Posterior oropharyngeal erythema present. Tonsils: No tonsillar exudate. 2+ on the right. 2+ on the left. Eyes:      Conjunctiva/sclera: Conjunctivae normal.   Neck:      Musculoskeletal: Normal range of motion. Trachea: Phonation normal.   Cardiovascular:      Rate and Rhythm: Normal rate. Pulmonary:      Effort: Pulmonary effort is normal. No respiratory distress. Abdominal:      General: There is no distension. Musculoskeletal: Normal range of motion. General: No tenderness.    Skin:     General: Skin is warm and dry. Neurological:      Mental Status: He is alert. He is not disoriented. Motor: No abnormal muscle tone. MDM      Pharyngitis and left ear pain. No respiratory distress. Bilateral TM normal.  Has been on z-pack. Suspect viral illness. Will obtain rapid strep if positive will treat with keflex which patient states he has tolerated in the past.  Dexamethasone 10mg PO ordered for pain. 1:54 AM  Rapid strep positive.   Keflex 500mg PO given in ED and escript 500mg PO BID x 10 days sent to preferred pharmacy  Procedures

## 2020-09-22 ENCOUNTER — PATIENT OUTREACH (OUTPATIENT)
Dept: CASE MANAGEMENT | Age: 30
End: 2020-09-22

## 2020-09-22 NOTE — PROGRESS NOTES
Patient contacted regarding recent discharge and COVID-19 risk. Discussed COVID-19 related testing which was not done at this time. Test results were not done. Patient informed of results, if available? no    Care Transition Nurse/ Ambulatory Care Manager/ LPN Care Coordinator contacted the patient by telephone to perform post discharge assessment. Verified name and  with patient as identifiers. Patient has following risk factors of: immunocompromised. CTN/ACM/LPN reviewed discharge instructions, medical action plan and red flags related to discharge diagnosis. Reviewed and educated them on any new and changed medications related to discharge diagnosis. Advised obtaining a 90-day supply of all daily and as-needed medications. Advance Care Planning:   Does patient have an Advance Directive: currently not on file; patient declined education    Education provided regarding infection prevention, and signs and symptoms of COVID-19 and when to seek medical attention with patient who verbalized understanding. Discussed exposure protocols and quarantine from [address] you at higher risk for severe illness  and given an opportunity for questions and concerns. The patient agrees to contact the COVID-19 hotline [phone] or PCP office for questions related to their healthcare. CTN/ACM/LPN provided contact information for future reference. From CDC: Are you at higher risk for severe illness?  Wash your hands often.  Avoid close contact (6 feet, which is about two arm lengths) with people who are sick.  Put distance between yourself and other people if COVID-19 is spreading in your community.  Clean and disinfect frequently touched surfaces.  Avoid all cruise travel and non-essential air travel.  Call your healthcare professional if you have concerns about COVID-19 and your underlying condition or if you are sick.     For more information on steps you can take to protect yourself, see CDC's How to Protect Yourself      Patient/family/caregiver given information for [facility] Loop and agrees to enroll no  Patient's preferred e-mail:  n/a  Patient's preferred phone number: n/a  Based on Loop alert triggers, patient will be contacted by nurse care manager for worsening symptoms. Plan for follow-up call in 7-14 days based on severity of symptoms and risk factors.

## 2020-10-07 ENCOUNTER — PATIENT OUTREACH (OUTPATIENT)
Dept: CASE MANAGEMENT | Age: 30
End: 2020-10-07

## 2020-10-07 NOTE — PROGRESS NOTES
Patient resolved from Transition of Care episode on 10/7/20. ACM/CTN was unsuccessful at contacting this patient today. Patient/family was provided the following resources and education related to COVID-19 during the initial call:                         Signs, symptoms and red flags related to COVID-19            CDC exposure and quarantine guidelines            [facility] exposure contact - [phone]            Contact for their local Department of Health                 Patient has not had any additional ED or hospital visits. No further outreach scheduled with this CTN/ACM. Episode of Care resolved. Patient has this CTN/ACM contact information if future needs arise.

## 2021-10-26 ENCOUNTER — APPOINTMENT (OUTPATIENT)
Dept: CT IMAGING | Age: 31
End: 2021-10-26
Attending: EMERGENCY MEDICINE
Payer: COMMERCIAL

## 2021-10-26 ENCOUNTER — HOSPITAL ENCOUNTER (EMERGENCY)
Age: 31
Discharge: HOME | End: 2021-10-27
Attending: EMERGENCY MEDICINE
Payer: COMMERCIAL

## 2021-10-26 VITALS
RESPIRATION RATE: 18 BRPM | OXYGEN SATURATION: 98 % | SYSTOLIC BLOOD PRESSURE: 125 MMHG | HEART RATE: 75 BPM | DIASTOLIC BLOOD PRESSURE: 78 MMHG | TEMPERATURE: 98.8 F

## 2021-10-26 DIAGNOSIS — J03.80 ACUTE TONSILLITIS DUE TO OTHER SPECIFIED ORGANISMS: Primary | ICD-10-CM

## 2021-10-26 LAB
ALBUMIN SERPL-MCNC: 4.1 G/DL (ref 3.5–5)
ALBUMIN/GLOB SERPL: 1.1 {RATIO} (ref 1.1–2.2)
ALP SERPL-CCNC: 129 U/L (ref 45–117)
ALT SERPL-CCNC: 92 U/L (ref 12–78)
ANION GAP SERPL CALC-SCNC: 14 MMOL/L (ref 5–15)
AST SERPL-CCNC: 32 U/L (ref 15–37)
BASOPHILS # BLD: 0 K/UL (ref 0–0.1)
BASOPHILS NFR BLD: 0 % (ref 0–1)
BILIRUB SERPL-MCNC: 1.6 MG/DL (ref 0.2–1)
BUN SERPL-MCNC: 14 MG/DL (ref 6–20)
BUN/CREAT SERPL: 11 (ref 12–20)
CALCIUM SERPL-MCNC: 8.9 MG/DL (ref 8.5–10.1)
CHLORIDE SERPL-SCNC: 97 MMOL/L (ref 97–108)
CO2 SERPL-SCNC: 25 MMOL/L (ref 21–32)
CREAT SERPL-MCNC: 1.25 MG/DL (ref 0.7–1.3)
DEPRECATED S PYO AG THROAT QL EIA: NEGATIVE
DIFFERENTIAL METHOD BLD: ABNORMAL
EOSINOPHIL # BLD: 0 K/UL (ref 0–0.4)
EOSINOPHIL NFR BLD: 0 % (ref 0–7)
ERYTHROCYTE [DISTWIDTH] IN BLOOD BY AUTOMATED COUNT: 12.8 % (ref 11.5–14.5)
GLOBULIN SER CALC-MCNC: 3.9 G/DL (ref 2–4)
GLUCOSE SERPL-MCNC: 113 MG/DL (ref 65–100)
HCT VFR BLD AUTO: 46.3 % (ref 36.6–50.3)
HETEROPH AB BLD QL IA: NEGATIVE
HGB BLD-MCNC: 15.7 G/DL (ref 12.1–17)
IMM GRANULOCYTES # BLD AUTO: 0.1 K/UL (ref 0–0.04)
IMM GRANULOCYTES NFR BLD AUTO: 0 % (ref 0–0.5)
LYMPHOCYTES # BLD: 1.8 K/UL (ref 0.8–3.5)
LYMPHOCYTES NFR BLD: 10 % (ref 12–49)
MCH RBC QN AUTO: 30.9 PG (ref 26–34)
MCHC RBC AUTO-ENTMCNC: 33.9 G/DL (ref 30–36.5)
MCV RBC AUTO: 91.1 FL (ref 80–99)
MONOCYTES # BLD: 1.7 K/UL (ref 0–1)
MONOCYTES NFR BLD: 9 % (ref 5–13)
NEUTS SEG # BLD: 15 K/UL (ref 1.8–8)
NEUTS SEG NFR BLD: 81 % (ref 32–75)
NRBC # BLD: 0 K/UL (ref 0–0.01)
NRBC BLD-RTO: 0 PER 100 WBC
PLATELET # BLD AUTO: 253 K/UL (ref 150–400)
PMV BLD AUTO: 9.6 FL (ref 8.9–12.9)
POTASSIUM SERPL-SCNC: 3.7 MMOL/L (ref 3.5–5.1)
PROT SERPL-MCNC: 8 G/DL (ref 6.4–8.2)
RBC # BLD AUTO: 5.08 M/UL (ref 4.1–5.7)
SODIUM SERPL-SCNC: 136 MMOL/L (ref 136–145)
WBC # BLD AUTO: 18.6 K/UL (ref 4.1–11.1)

## 2021-10-26 PROCEDURE — 96375 TX/PRO/DX INJ NEW DRUG ADDON: CPT

## 2021-10-26 PROCEDURE — 86308 HETEROPHILE ANTIBODY SCREEN: CPT

## 2021-10-26 PROCEDURE — 70491 CT SOFT TISSUE NECK W/DYE: CPT

## 2021-10-26 PROCEDURE — 99281 EMR DPT VST MAYX REQ PHY/QHP: CPT

## 2021-10-26 PROCEDURE — 87070 CULTURE OTHR SPECIMN AEROBIC: CPT

## 2021-10-26 PROCEDURE — 74011250636 HC RX REV CODE- 250/636: Performed by: EMERGENCY MEDICINE

## 2021-10-26 PROCEDURE — 74011000636 HC RX REV CODE- 636: Performed by: EMERGENCY MEDICINE

## 2021-10-26 PROCEDURE — 36415 COLL VENOUS BLD VENIPUNCTURE: CPT

## 2021-10-26 PROCEDURE — 96361 HYDRATE IV INFUSION ADD-ON: CPT

## 2021-10-26 PROCEDURE — 86060 ANTISTREPTOLYSIN O TITER: CPT

## 2021-10-26 PROCEDURE — 87880 STREP A ASSAY W/OPTIC: CPT

## 2021-10-26 PROCEDURE — 85025 COMPLETE CBC W/AUTO DIFF WBC: CPT

## 2021-10-26 PROCEDURE — 96365 THER/PROPH/DIAG IV INF INIT: CPT

## 2021-10-26 PROCEDURE — 80053 COMPREHEN METABOLIC PANEL: CPT

## 2021-10-26 RX ORDER — HYDROCODONE BITARTRATE AND ACETAMINOPHEN 5; 325 MG/1; MG/1
1 TABLET ORAL
Qty: 10 TABLET | Refills: 0 | Status: SHIPPED | OUTPATIENT
Start: 2021-10-26 | End: 2021-10-29

## 2021-10-26 RX ORDER — NAPROXEN 500 MG/1
500 TABLET ORAL 2 TIMES DAILY WITH MEALS
Qty: 20 TABLET | Refills: 0 | Status: SHIPPED | OUTPATIENT
Start: 2021-10-26

## 2021-10-26 RX ORDER — CLINDAMYCIN PHOSPHATE 900 MG/50ML
900 INJECTION INTRAVENOUS
Status: COMPLETED | OUTPATIENT
Start: 2021-10-26 | End: 2021-10-27

## 2021-10-26 RX ORDER — DEXAMETHASONE SODIUM PHOSPHATE 10 MG/ML
10 INJECTION INTRAMUSCULAR; INTRAVENOUS
Status: COMPLETED | OUTPATIENT
Start: 2021-10-26 | End: 2021-10-26

## 2021-10-26 RX ORDER — CLINDAMYCIN HYDROCHLORIDE 300 MG/1
300 CAPSULE ORAL 3 TIMES DAILY
Qty: 30 CAPSULE | Refills: 0 | Status: SHIPPED | OUTPATIENT
Start: 2021-10-26 | End: 2021-11-05

## 2021-10-26 RX ORDER — HYDROMORPHONE HYDROCHLORIDE 1 MG/ML
0.5 INJECTION, SOLUTION INTRAMUSCULAR; INTRAVENOUS; SUBCUTANEOUS
Status: COMPLETED | OUTPATIENT
Start: 2021-10-26 | End: 2021-10-26

## 2021-10-26 RX ADMIN — HYDROMORPHONE HYDROCHLORIDE 0.5 MG: 1 INJECTION, SOLUTION INTRAMUSCULAR; INTRAVENOUS; SUBCUTANEOUS at 22:15

## 2021-10-26 RX ADMIN — DEXAMETHASONE SODIUM PHOSPHATE 10 MG: 10 INJECTION, SOLUTION INTRAMUSCULAR; INTRAVENOUS at 22:15

## 2021-10-26 RX ADMIN — SODIUM CHLORIDE 1000 ML: 9 INJECTION, SOLUTION INTRAVENOUS at 22:17

## 2021-10-26 RX ADMIN — IOPAMIDOL 100 ML: 612 INJECTION, SOLUTION INTRAVENOUS at 22:55

## 2021-10-26 RX ADMIN — CLINDAMYCIN PHOSPHATE 900 MG: 900 INJECTION, SOLUTION INTRAVENOUS at 23:23

## 2021-10-26 NOTE — Clinical Note
[facility]  [address]  [id number]  [phone]    Work/School Note    Date: 10/26/2021    To Whom It May concern:    Cirilo Angulo was seen and treated today in the emergency room by the following provider(s):  Attending Provider: Pierce Hinson MD.      Cirilo Angulo is excused from work/school on 10/26/21 and 10/27/21. He is medically clear to return to work/school on 10/28/2021.        Sincerely,          Ida Mohs, MD

## 2021-10-27 NOTE — ED PROVIDER NOTES
The patient is a 77-year-old male with a past medical history significant for acid reflux and HIV and fatty liver who presents to the ED with a complaint of sore throat, headache, neck stiffness, that began a week ago. The patient tested negative for Covid  19 infection. The patient was diagnosed with strep pharyngitis a month ago however he states that the symptoms returned over the last 2 days. He admits to low-grade temperature, headache, blurred vision, nausea, vomiting, diarrhea, constipation, dysuria, hematuria, dizziness, extremity weakness or numbness.            Past Medical History:   Diagnosis Date    Acid reflux     HIV [facility] immunodeficiency [facility] ([facility] [address]     HIV (human immunodeficiency virus infection) ([facility] 2019    Liver disease     Fatty Liver       Past Surgical History:   Procedure Laterality Date    HX TYMPANOSTOMY           Family History:   Problem Relation Age of Onset    Heart Attack Father     No Known Problems Sister        Social History     Socioeconomic History    Marital status: SINGLE     Spouse name: Not on file    Number of children: Not on file    Years of education: Not on file    Highest education level: Not on file   Occupational History    Not on file   Tobacco Use    Smoking status: Former Smoker     Packs/day: 1.00     Years: 6.00     Pack years: 6.00     Quit date: 2019     Years since quittin.3    Smokeless tobacco: Never Used    Tobacco comment: quit   Substance and Sexual Activity    Alcohol use: Yes     Comment: rarely    Drug use: No    Sexual activity: Not on file   Other Topics Concern    Not on file   Social History Narrative    Not on file     Social Determinants of Health     Financial Resource Strain:     Difficulty of Paying Living Expenses:    Food Insecurity:     Worried About Running Out of Food in the Last Year:     [address] in the Last Year:    Transportation Needs:     Lack of Transportation (Medical):  Lack of Transportation (Non-Medical):    Physical Activity:     Days of Exercise per Week:     Minutes of Exercise per Session:    Stress:     Feeling of Stress :    Social Connections:     Frequency of Communication with Friends and Family:     Frequency of Social Gatherings with Friends and Family:     Attends [employer] Services:     Active Member of Clubs or Organizations:     Attends Club or Organization Meetings:     Marital Status:    Intimate Partner Violence:     Fear of Current or Ex-Partner:     Emotionally Abused:     Physically Abused:     Sexually Abused: ALLERGIES: Amoxicillin and Augmentin [amoxicillin-pot clavulanate]    Review of Systems   All other systems reviewed and are negative. Vitals:    10/26/21 2120   BP: 125/78   Pulse: 75   Resp: 18   Temp: 98.8 °F (37.1 °C)   SpO2: 98%            Physical Exam  Vitals and nursing note reviewed. CONSTITUTIONAL: Well-appearing; well-nourished; in no apparent distress  HEAD: Normocephalic; atraumatic  EYES: PERRL; EOM intact; conjunctiva and sclera are clear bilaterally. ENT: No rhinorrhea; pharyngeal erythema with  tonsillar hypertrophy; and exudates mucous membranes pink/moist,   NECK: Supple; non-tender; no cervical lymphadenopathy  CARD: Normal S1, S2; no murmurs, rubs, or gallops. Regular rate and rhythm. RESP: Normal respiratory effort; breath sounds clear and equal bilaterally; no wheezes, rhonchi, or rales. ABD: Normal bowel sounds; non-distended; non-tender; no palpable organomegaly, no masses, no bruits. Back Exam: Normal inspection; no vertebral point tenderness, no CVA tenderness. Normal range of motion. EXT: Normal ROM in all four extremities; non-tender to palpation; no swelling or deformity; distal pulses are normal, no edema. SKIN: Warm; dry; no rash.   NEURO:Alert and oriented x 3, coherent, SOLO-XII grossly intact, sensory and motor are non-focal.        MDM  Number of Diagnoses or Management Options  Acute tonsillitis due to other specified organisms  Diagnosis management comments: Assessment: 35-year-old male who presents to the ER with likely what appears to be tonsillitis rule out strep pharyngitis, peritonsillar abscess and dehydration. Plan: Lab/IV fluid/analgesia/steroid/education/ Monitor and Reevaluate. Amount and/or Complexity of Data Reviewed  Clinical lab tests: ordered and reviewed  Tests in the radiology section of CPT®: ordered and reviewed  Tests in the medicine section of CPT®: reviewed and ordered  Discussion of test results with the performing providers: yes  Decide to obtain previous medical records or to obtain history from someone other than the patient: yes  Obtain history from someone other than the patient: yes  Review and summarize past medical records: yes  Discuss the patient with other providers: yes  Independent visualization of images, tracings, or specimens: yes           Procedures      Progress Note:   Pt has been reexamined by Teja Ball MD. Pt is feeling much better. Symptoms have improved. All available results have been reviewed with pt and any available family. Pt understands sx, dx, and tx in ED. Care plan has been outlined and questions have been answered. Pt is ready to go home. Will send home on pharyngitis and tonsillitis instruction. .  Prescription of Norco, Zofran, clindamycin. outpatient referral with PCP/ENT for reevaluation and further treatment as needed. Written by Teja Ball MD,11:57 PM    .   .

## 2021-10-28 LAB — ASO AB SERPL-ACNC: 502 IU/ML (ref 0–200)

## 2021-10-29 LAB
BACTERIA SPEC CULT: NORMAL
SERVICE CMNT-IMP: NORMAL

## 2022-03-18 PROBLEM — R59.1 LYMPHADENOPATHY: Status: ACTIVE | Noted: 2020-06-01

## 2022-03-18 PROBLEM — C46.9 KAPOSI SARCOMA (HCC): Status: ACTIVE | Noted: 2020-05-26

## 2022-03-19 PROBLEM — K21.9 GASTROESOPHAGEAL REFLUX DISEASE: Status: ACTIVE | Noted: 2020-06-01

## 2022-03-19 PROBLEM — E66.9 OBESITY: Status: ACTIVE | Noted: 2020-06-01

## 2022-03-19 PROBLEM — B20 HIV INFECTION (HCC): Status: ACTIVE | Noted: 2020-06-01

## 2022-03-19 PROBLEM — Z21 HIV INFECTION (HCC): Status: ACTIVE | Noted: 2020-06-01

## 2022-03-20 PROBLEM — K76.0 STEATOSIS OF LIVER: Status: ACTIVE | Noted: 2020-06-01

## 2022-03-20 PROBLEM — Z21 HIV (HUMAN IMMUNODEFICIENCY VIRUS INFECTION) (HCC): Status: ACTIVE | Noted: 2019-06-26

## 2022-03-20 PROBLEM — B20 HIV (HUMAN IMMUNODEFICIENCY VIRUS INFECTION) (HCC): Status: ACTIVE | Noted: 2019-06-26

## 2023-05-24 RX ORDER — ERGOCALCIFEROL 1.25 MG/1
50000 CAPSULE ORAL
COMMUNITY

## 2023-05-24 RX ORDER — ATORVASTATIN CALCIUM 20 MG/1
TABLET, FILM COATED ORAL DAILY
COMMUNITY

## 2023-05-24 RX ORDER — LEVOCETIRIZINE DIHYDROCHLORIDE 5 MG/1
5 TABLET, FILM COATED ORAL
COMMUNITY

## 2023-05-24 RX ORDER — NAPROXEN 500 MG/1
500 TABLET ORAL 2 TIMES DAILY WITH MEALS
COMMUNITY
Start: 2021-10-26

## 2023-05-24 RX ORDER — ALBUTEROL SULFATE 90 UG/1
2 AEROSOL, METERED RESPIRATORY (INHALATION) EVERY 4 HOURS PRN
COMMUNITY
Start: 2019-01-18

## 2023-05-24 RX ORDER — BICTEGRAVIR SODIUM, EMTRICITABINE, AND TENOFOVIR ALAFENAMIDE FUMARATE 50; 200; 25 MG/1; MG/1; MG/1
1 TABLET ORAL DAILY
COMMUNITY

## 2023-05-24 RX ORDER — LORAZEPAM 1 MG/1
1 TABLET ORAL EVERY 4 HOURS PRN
COMMUNITY
Start: 2020-04-01

## 2024-10-31 ENCOUNTER — TRANSCRIBE ORDERS (OUTPATIENT)
Facility: HOSPITAL | Age: 34
End: 2024-10-31

## 2024-10-31 DIAGNOSIS — Z00.00 PREVENTATIVE HEALTH CARE: Primary | ICD-10-CM

## 2024-11-21 ENCOUNTER — HOSPITAL ENCOUNTER (OUTPATIENT)
Facility: HOSPITAL | Age: 34
Discharge: HOME | End: 2024-11-21
Attending: INTERNAL MEDICINE

## 2024-11-21 DIAGNOSIS — Z00.00 PREVENTATIVE HEALTH CARE: ICD-10-CM

## 2024-11-21 PROCEDURE — 75571 CT HRT W/O DYE W/CA TEST: CPT
